# Patient Record
Sex: MALE | Race: WHITE | Employment: OTHER | URBAN - METROPOLITAN AREA
[De-identification: names, ages, dates, MRNs, and addresses within clinical notes are randomized per-mention and may not be internally consistent; named-entity substitution may affect disease eponyms.]

---

## 2017-01-23 ENCOUNTER — OFFICE VISIT (OUTPATIENT)
Dept: SURGERY | Age: 77
End: 2017-01-23

## 2017-01-23 VITALS
HEART RATE: 64 BPM | WEIGHT: 294 LBS | BODY MASS INDEX: 42.09 KG/M2 | HEIGHT: 70 IN | SYSTOLIC BLOOD PRESSURE: 136 MMHG | DIASTOLIC BLOOD PRESSURE: 66 MMHG

## 2017-01-23 LAB — GLUCOSE BLD-MCNC: 98 MG/DL

## 2017-01-23 PROCEDURE — 99213 OFFICE O/P EST LOW 20 MIN: CPT | Performed by: INTERNAL MEDICINE

## 2017-01-23 PROCEDURE — 1036F TOBACCO NON-USER: CPT | Performed by: INTERNAL MEDICINE

## 2017-01-23 PROCEDURE — 4040F PNEUMOC VAC/ADMIN/RCVD: CPT | Performed by: INTERNAL MEDICINE

## 2017-01-23 PROCEDURE — G8484 FLU IMMUNIZE NO ADMIN: HCPCS | Performed by: INTERNAL MEDICINE

## 2017-01-23 PROCEDURE — 82962 GLUCOSE BLOOD TEST: CPT | Performed by: INTERNAL MEDICINE

## 2017-01-23 PROCEDURE — G8419 CALC BMI OUT NRM PARAM NOF/U: HCPCS | Performed by: INTERNAL MEDICINE

## 2017-01-23 PROCEDURE — 1123F ACP DISCUSS/DSCN MKR DOCD: CPT | Performed by: INTERNAL MEDICINE

## 2017-01-23 PROCEDURE — G8427 DOCREV CUR MEDS BY ELIG CLIN: HCPCS | Performed by: INTERNAL MEDICINE

## 2017-01-29 ASSESSMENT — ENCOUNTER SYMPTOMS: EYES NEGATIVE: 1

## 2017-05-12 ENCOUNTER — OFFICE VISIT (OUTPATIENT)
Dept: SURGERY | Age: 77
End: 2017-05-12

## 2017-05-12 VITALS
HEART RATE: 69 BPM | DIASTOLIC BLOOD PRESSURE: 68 MMHG | WEIGHT: 300 LBS | BODY MASS INDEX: 42.95 KG/M2 | HEIGHT: 70 IN | SYSTOLIC BLOOD PRESSURE: 120 MMHG

## 2017-05-12 DIAGNOSIS — N18.3 CKD (CHRONIC KIDNEY DISEASE), STAGE 3 (MODERATE): ICD-10-CM

## 2017-05-12 DIAGNOSIS — R60.0 LOCALIZED EDEMA: ICD-10-CM

## 2017-05-12 LAB — GLUCOSE BLD-MCNC: 161 MG/DL

## 2017-05-12 PROCEDURE — G8428 CUR MEDS NOT DOCUMENT: HCPCS | Performed by: INTERNAL MEDICINE

## 2017-05-12 PROCEDURE — 99213 OFFICE O/P EST LOW 20 MIN: CPT | Performed by: INTERNAL MEDICINE

## 2017-05-12 PROCEDURE — 4040F PNEUMOC VAC/ADMIN/RCVD: CPT | Performed by: INTERNAL MEDICINE

## 2017-05-12 PROCEDURE — G8417 CALC BMI ABV UP PARAM F/U: HCPCS | Performed by: INTERNAL MEDICINE

## 2017-05-12 PROCEDURE — 1123F ACP DISCUSS/DSCN MKR DOCD: CPT | Performed by: INTERNAL MEDICINE

## 2017-05-12 PROCEDURE — 1036F TOBACCO NON-USER: CPT | Performed by: INTERNAL MEDICINE

## 2017-05-12 PROCEDURE — 82962 GLUCOSE BLOOD TEST: CPT | Performed by: INTERNAL MEDICINE

## 2017-05-12 RX ORDER — LANCETS
EACH MISCELLANEOUS
Qty: 300 EACH | Refills: 3 | Status: SHIPPED | OUTPATIENT
Start: 2017-05-12 | End: 2018-02-13 | Stop reason: SDUPTHER

## 2017-05-12 RX ORDER — LANCETS
EACH MISCELLANEOUS
Qty: 100 EACH | Refills: 3
Start: 2017-05-12 | End: 2018-02-13 | Stop reason: ALTCHOICE

## 2018-01-02 RX ORDER — INSULIN ASPART 100 [IU]/ML
INJECTION, SUSPENSION SUBCUTANEOUS
Qty: 270 ML | Refills: 2 | Status: SHIPPED | OUTPATIENT
Start: 2018-01-02 | End: 2018-02-13 | Stop reason: SDUPTHER

## 2018-02-13 ENCOUNTER — OFFICE VISIT (OUTPATIENT)
Dept: ENDOCRINOLOGY | Age: 78
End: 2018-02-13
Payer: MEDICARE

## 2018-02-13 VITALS
HEART RATE: 86 BPM | DIASTOLIC BLOOD PRESSURE: 74 MMHG | SYSTOLIC BLOOD PRESSURE: 136 MMHG | HEIGHT: 70 IN | BODY MASS INDEX: 37.51 KG/M2 | WEIGHT: 262 LBS

## 2018-02-13 LAB — GLUCOSE BLD-MCNC: 247 MG/DL

## 2018-02-13 PROCEDURE — 4040F PNEUMOC VAC/ADMIN/RCVD: CPT | Performed by: INTERNAL MEDICINE

## 2018-02-13 PROCEDURE — 1123F ACP DISCUSS/DSCN MKR DOCD: CPT | Performed by: INTERNAL MEDICINE

## 2018-02-13 PROCEDURE — 1036F TOBACCO NON-USER: CPT | Performed by: INTERNAL MEDICINE

## 2018-02-13 PROCEDURE — 82962 GLUCOSE BLOOD TEST: CPT | Performed by: INTERNAL MEDICINE

## 2018-02-13 PROCEDURE — G8484 FLU IMMUNIZE NO ADMIN: HCPCS | Performed by: INTERNAL MEDICINE

## 2018-02-13 PROCEDURE — G8427 DOCREV CUR MEDS BY ELIG CLIN: HCPCS | Performed by: INTERNAL MEDICINE

## 2018-02-13 PROCEDURE — 99213 OFFICE O/P EST LOW 20 MIN: CPT | Performed by: INTERNAL MEDICINE

## 2018-02-13 PROCEDURE — G8417 CALC BMI ABV UP PARAM F/U: HCPCS | Performed by: INTERNAL MEDICINE

## 2018-02-13 RX ORDER — GLUCOSAMINE HCL/CHONDROITIN SU 500-400 MG
1 CAPSULE ORAL 3 TIMES DAILY
Qty: 100 STRIP | Refills: 6 | Status: SHIPPED | OUTPATIENT
Start: 2018-02-13

## 2018-02-13 RX ORDER — LANCETS 30 GAUGE
1 EACH MISCELLANEOUS 3 TIMES DAILY
Qty: 100 EACH | Refills: 6 | Status: SHIPPED | OUTPATIENT
Start: 2018-02-13

## 2018-02-13 NOTE — PROGRESS NOTES
Monitoring Suppl DAE, 1 each by Does not apply route 3 times daily DX: E11.65, IDDM (please dispense covered brand), Disp: 1 Device, Rfl: 0    Glucose Blood (BLOOD GLUCOSE TEST STRIPS) STRP, 1 each by In Vitro route 3 times daily DX: E11.65, IDDM (please dispense covered brand), Disp: 100 strip, Rfl: 6    Lancets MISC, 1 each by Does not apply route 3 times daily DX:E11.65, IDDM (please dispense covered brand), Disp: 100 each, Rfl: 6    insulin aspart protamine-insulin aspart (NOVOLOG MIX 70/30 FLEXPEN) (70-30) 100 UNIT/ML injection, 10  units twice a day if glucose more than 200, Disp: 10 mL, Rfl: 2    Elastic Bandages & Supports (JOBST KNEE HIGH COMPRESSION SM) MISC, Extra large  diagnosis edema, Disp: 8 each, Rfl: 01    Insulin Pen Needle (NOVOFINE) 32G X 6 MM MISC, USE TWICE A DAY, Disp: 300 each, Rfl: 2    predniSONE (DELTASONE) 10 MG tablet, Take 10 mg by mouth daily, Disp: , Rfl:     atenolol (TENORMIN) 25 MG tablet, Take 1 tablet by mouth daily, Disp: 30 tablet, Rfl: 03    COLCRYS 0.6 MG tablet, Take 0.6 mg by mouth 2 times daily , Disp: , Rfl:     traMADol (ULTRAM) 50 MG tablet, Take 50 mg by mouth every 6 hours as needed for Pain, Disp: , Rfl:     aspirin 81 MG tablet, Take 81 mg by mouth daily. , Disp: , Rfl:     simvastatin (ZOCOR) 40 MG tablet, Take 40 mg by mouth nightly., Disp: , Rfl:     furosemide (LASIX) 40 MG tablet, Take 40 mg by mouth 2 times daily. , Disp: , Rfl:     gabapentin (NEURONTIN) 300 MG capsule, Take 300 mg by mouth 3 times daily. , Disp: , Rfl:       Review of Systems   Constitutional: Positive for fatigue and unexpected weight change. Cardiovascular: Positive for leg swelling. Neurological: Positive for dizziness. Psychiatric/Behavioral: The patient is nervous/anxious. All other systems reviewed and are negative.     Vitals:    02/13/18 1306   BP: 136/74   Site: Left Arm   Position: Sitting   Cuff Size: Medium Adult   Pulse: 86   Weight: 262 lb (118.8 kg) 10 mL     Refill:  2     Follow-up in 2 or 3 months time